# Patient Record
Sex: FEMALE | Race: WHITE | URBAN - METROPOLITAN AREA
[De-identification: names, ages, dates, MRNs, and addresses within clinical notes are randomized per-mention and may not be internally consistent; named-entity substitution may affect disease eponyms.]

---

## 2017-06-09 ENCOUNTER — HISTORICAL (OUTPATIENT)
Dept: LAB | Facility: HOSPITAL | Age: 82
End: 2017-06-09

## 2017-06-09 LAB
BUN SERPL-MCNC: 24 MG/DL (ref 7–18)
CALCIUM SERPL-MCNC: 9.2 MG/DL (ref 8.5–10.1)
CHLORIDE SERPL-SCNC: 104 MMOL/L (ref 98–107)
CO2 SERPL-SCNC: 39.4 MMOL/L (ref 21–32)
CREAT SERPL-MCNC: 0.92 MG/DL (ref 0.6–1.3)
GLUCOSE SERPL-MCNC: 122 MG/DL (ref 74–106)
POTASSIUM SERPL-SCNC: 5.1 MMOL/L (ref 3.5–5.1)
SODIUM SERPL-SCNC: 145 MMOL/L (ref 136–145)

## 2017-07-19 ENCOUNTER — HOSPITAL ENCOUNTER (OUTPATIENT)
Dept: MEDSURG UNIT | Facility: HOSPITAL | Age: 82
End: 2017-07-21
Attending: INTERNAL MEDICINE | Admitting: INTERNAL MEDICINE

## 2017-07-20 LAB
ABS NEUT (OLG): 2.3 X10(3)/MCL (ref 2.1–9.2)
ALBUMIN SERPL-MCNC: 3.2 GM/DL (ref 3.4–5)
ALBUMIN/GLOB SERPL: 1.2 RATIO (ref 1.1–2)
ALP SERPL-CCNC: 119 UNIT/L (ref 46–116)
ALT SERPL-CCNC: 16 UNIT/L (ref 12–78)
AST SERPL-CCNC: 14 UNIT/L (ref 15–37)
BASOPHILS # BLD AUTO: 0 X10(3)/MCL
BASOPHILS NFR BLD AUTO: 1 % (ref 0–2)
BILIRUB SERPL-MCNC: 1.1 MG/DL (ref 0.2–1)
BILIRUBIN DIRECT+TOT PNL SERPL-MCNC: 0.38 MG/DL (ref 0–0.2)
BILIRUBIN DIRECT+TOT PNL SERPL-MCNC: 0.72 MG/DL (ref 0–0.8)
BNP BLD-MCNC: 7448 PG/ML (ref 0–125)
BUN SERPL-MCNC: 27 MG/DL (ref 7–18)
CALCIUM SERPL-MCNC: 9 MG/DL (ref 8.5–10.1)
CHLORIDE SERPL-SCNC: 103 MMOL/L (ref 98–107)
CO2 SERPL-SCNC: 38.3 MMOL/L (ref 21–32)
CREAT SERPL-MCNC: 0.97 MG/DL (ref 0.6–1.3)
EOSINOPHIL # BLD AUTO: 0.1 X10(3)/MCL
EOSINOPHIL NFR BLD AUTO: 3 %
ERYTHROCYTE [DISTWIDTH] IN BLOOD BY AUTOMATED COUNT: 16.8 % (ref 11.5–17)
GLOBULIN SER-MCNC: 2.6 GM/DL (ref 2.4–3.5)
GLUCOSE SERPL-MCNC: 107 MG/DL (ref 74–106)
HCT VFR BLD AUTO: 34.4 % (ref 37–47)
HGB BLD-MCNC: 10.9 GM/DL (ref 12–16)
LYMPHOCYTES # BLD AUTO: 0.7 X10(3)/MCL
LYMPHOCYTES NFR BLD AUTO: 20 % (ref 13–40)
MCH RBC QN AUTO: 29.3 PG (ref 27–31)
MCHC RBC AUTO-ENTMCNC: 31.6 GM/DL (ref 33–36)
MCV RBC AUTO: 92.9 FL (ref 80–94)
MONOCYTES # BLD AUTO: 0.4 X10(3)/MCL
MONOCYTES NFR BLD AUTO: 10 % (ref 2–11)
NEUTROPHILS # BLD AUTO: 2.3 X10(3)/MCL (ref 2.1–9.2)
NEUTROPHILS NFR BLD AUTO: 66 % (ref 47–80)
PLATELET # BLD AUTO: 110 X10(3)/MCL (ref 130–400)
PMV BLD AUTO: 8.5 FL (ref 7.4–10.4)
POTASSIUM SERPL-SCNC: 3.9 MMOL/L (ref 3.5–5.1)
PROT SERPL-MCNC: 5.8 GM/DL (ref 6.4–8.2)
RBC # BLD AUTO: 3.71 X10(6)/MCL (ref 4.2–5.4)
SODIUM SERPL-SCNC: 144 MMOL/L (ref 136–145)
TROPONIN I SERPL-MCNC: 0.04 NG/ML (ref 0.02–0.06)
WBC # SPEC AUTO: 3.5 X10(3)/MCL (ref 4.5–11.5)

## 2017-08-17 ENCOUNTER — HISTORICAL (OUTPATIENT)
Dept: LAB | Facility: HOSPITAL | Age: 82
End: 2017-08-17

## 2017-08-17 LAB
BUN SERPL-MCNC: 35 MG/DL (ref 7–18)
CALCIUM SERPL-MCNC: 9.2 MG/DL (ref 8.5–10.1)
CHLORIDE SERPL-SCNC: 99 MMOL/L (ref 98–107)
CO2 SERPL-SCNC: 38 MMOL/L (ref 21–32)
CREAT SERPL-MCNC: 1.13 MG/DL (ref 0.6–1.3)
GLUCOSE SERPL-MCNC: 107 MG/DL (ref 74–106)
POTASSIUM SERPL-SCNC: 4.6 MMOL/L (ref 3.5–5.1)
SODIUM SERPL-SCNC: 139 MMOL/L (ref 136–145)

## 2022-04-30 NOTE — CONSULTS
Patient:   Aliyah De Leon            MRN: [id number]            FIN: [id number]               Age:   89 years     Sex:  Female     :  10/2/1917   Associated Diagnoses:   None   Author:   Kar Gibson DO      Basic Information   Source of history:  Family member, Medical record.       Chief Complaint   2017 13:59 CDT      Complains of shortness of breath onset after nap at 1230 today. History CHF      History of Present Illness   89 y/o WFwith known moderate MR, HFpEF, persistant afib, mild AI, pulmonary hypertension with estimated PASP 75mmHg presented to [facility] c/o dyspnea.        Review of Systems   Constitutional:  Fatigue.    Eye:  Negative.    Ear/Nose/Mouth/Throat:  Negative.    Respiratory:  Shortness of breath.    Cardiovascular:  Negative.    Gastrointestinal:  Negative.    Genitourinary:  Negative.    Hematology/Lymphatics:  Negative.    Endocrine:  Negative.    Immunologic:  Negative.    Musculoskeletal:  Negative.    Integumentary:  Negative.    Neurologic:  Negative.    Psychiatric:  Negative.       Health Status   Allergies:    Allergic Reactions (Selected)  No Known Allergies   Current medications:  (Selected)   Inpatient Medications  Ordered  Lasix: 40 mg, form: Injection, IV Push, BID, first dose 17 18:00:00 CDT, 24,037  Voltaren 1% topical gel: 0.02 EA, 2 gm =, form: Gel, TOP, QID, first dose 17 9:23:00 CDT  alPRAzolam 0.5 mg oral tablet: 0.25 mg, form: Tab, Oral, At Bedtime, first dose 17 21:00:00 CDT, 23188  aspirin 81 mg oral Delayed Release (EC) tablet: 81 mg, form: Tab-EC, Oral, Daily, first dose 17 9:00:00 CDT, 23  carvedilol 12.5 mg oral tablet: 12.5 mg, form: Tab, Oral, BID, first dose 17 21:00:00 CDT, 24,034  fentaNYL 25 mcg/hr transdermal film, extended release: 1 patch(es), 25 mcg =, form: Patch-72, TOP, q72hr, first dose 17 16:00:00 CDT  levothyroxine 0.075mg tab: 1 tab(s), form: Dose, Oral, Daily, first dose 17 6:00:00  CDT  nitroglycerin 2% transdermal ointment: 1 in, form: Ointment, TOP, [id number] (6-12-6-12), first dose 07/20/17 10:18:00 CDT  traMADol: 50 mg, form: Tab, Oral, q12hr PRN for pain, first dose 07/19/17 15:48:00 CDT, 30,024  Prescriptions  Prescribed  Proventil HFA 90 mcg/inh inhalation aerosol with adapter: 2 puff(s), INH, q4hr, PRN PRN shortness of breath or wheezing, # 6.7 gm, 0 Refill(s), [id number]  Documented Medications  Documented  Voltaren 1% topical gel: 1 jen, TOP, QID, # 100 gm, 0 Refill(s)  alPRAzolam 0.5 mg oral tablet: 0.25 mg = 0.5 tab(s), Oral, Daily, 0 Refill(s)  aspirin 81 mg oral tablet, CHEWABLE: 81 mg = 1 tab(s), Oral, Daily, 0 Refill(s)  carvedilol 12.5 mg oral tablet: 12.5 mg = 1 tab(s), Oral, BID, # 60 tab(s), 0 Refill(s)  fentaNYL 25 mcg/hr transdermal film, extended release: 1 patch(es), TOP, q72hr, 0 Refill(s)  furosemide 40 mg oral tablet: 40 mg = 1 tab(s), Oral, Daily, # 30 tab(s), 0 Refill(s)  levothyroxine 75 mcg (0.075 mg) oral tablet: = 1 tab(s), Oral, Daily, # 30 tab(s), 0 Refill(s)  traMADol 50 mg oral tablet: 50 mg = 1 tab(s), Oral, q12hr, PRN PRN for pain, # 30 tab(s), 0 Refill(s)   Problem list:    All Problems  Dysrhythmia / ICD-9-.9 / Confirmed  HTN / ICD-9-.9 / Confirmed,    Active Problems (2)  Dysrhythmia   HTN         Histories   Past Medical History:    Active  Dysrhythmia (427.9)  HTN (401.9)  Resolved  Pacemaker ([id number]):  Resolved on 12/8/2015 at 89 years.  Comments:  12/8/2015 CST 8:34 CST - SYSTEM  Problem automatically updated based on documentation on Capillary Refills, Brachial Pulses, Radial Pulses, Femoral Pulses, Dorsalis Pulses, Ulnar pulses, Popliteal Pulses, Postibial Pulses, Edemas, Affect/Behavior, Orientation Assessment, Arterial Line Site.  PNA (pneumonia) (486):  Resolved on 12/8/2015 at 89 years.  Comments:  12/8/2015 CST 8:34 CST - SYSTEM  Problem automatically updated based on  documentation on Capillary Refills, Brachial Pulses, Radial Pulses, Femoral Pulses, Dorsalis Pulses, Ulnar pulses, Popliteal Pulses, Postibial Pulses, Edemas, Affect/Behavior, Orientation Assessment, Arterial Line Site.  Home O2 ([id number]):  Resolved on 12/8/2015 at 89 years.  Comments:  12/8/2015 CST 8:34 CST - SYSTEM  Problem automatically updated based on documentation on Capillary Refills, Brachial Pulses, Radial Pulses, Femoral Pulses, Dorsalis Pulses, Ulnar pulses, Popliteal Pulses, Postibial Pulses, Edemas, Affect/Behavior, Orientation Assessment, Arterial Line Site.  Anxiety ([id number]):  Resolved on 12/8/2015 at 89 years.  Comments:  12/8/2015 CST 8:34 CST - SYSTEM  Problem automatically updated based on documentation on Capillary Refills, Brachial Pulses, Radial Pulses, Femoral Pulses, Dorsalis Pulses, Ulnar pulses, Popliteal Pulses, Postibial Pulses, Edemas, Affect/Behavior, Orientation Assessment, Arterial Line Site.  Hypothyroid ([id number]):  Resolved on 12/8/2015 at 89 years.  Comments:  12/8/2015 CST 8:34 CST - SYSTEM  Problem automatically updated based on documentation on Capillary Refills, Brachial Pulses, Radial Pulses, Femoral Pulses, Dorsalis Pulses, Ulnar pulses, Popliteal Pulses, Postibial Pulses, Edemas, Affect/Behavior, Orientation Assessment, Arterial Line Site.  Breast cancer ([id number]):  Resolved on 12/8/2015 at 89 years.  Comments:  12/8/2015 CST 8:34 CST - SYSTEM  Problem automatically updated based on documentation on Capillary Refills, Brachial Pulses, Radial Pulses, Femoral Pulses, Dorsalis Pulses, Ulnar pulses, Popliteal Pulses, Postibial Pulses, Edemas, Affect/Behavior, Orientation Assessment, Arterial Line Site.  Uterine cancer (182.0):  Resolved on 12/8/2015 at 89 years.  Comments:  12/8/2015 CST 8:34 CST - SYSTEM  Problem automatically updated based on documentation on Capillary Refills, Brachial Pulses, Radial Pulses, Femoral Pulses, Dorsalis Pulses, Ulnar pulses, Popliteal  Pulses, Postibial Pulses, Edemas, Affect/Behavior, Orientation Assessment, Arterial Line Site.  CHF (congestive heart failure) ([id number]):  Resolved on 12/8/2015 at 89 years.  Comments:  12/8/2015 CST 8:34 CST - SYSTEM  Problem automatically updated based on documentation on Capillary Refills, Brachial Pulses, Radial Pulses, Femoral Pulses, Dorsalis Pulses, Ulnar pulses, Popliteal Pulses, Postibial Pulses, Edemas, Affect/Behavior, Orientation Assessment, Arterial Line Site.   Family History:    Dementia  Sister  Acute myocardial infarction.  Father     Social History        Social & Psychosocial Habits    Alcohol  09/01/2013 Risk Assessment: Denies Alcohol Use    03/16/2016  Use: Never    Substance Abuse  04/19/2015 Risk Assessment: Denies Substance Abuse    03/16/2016  Use: Never    Tobacco  09/01/2013 Risk Assessment: Denies Tobacco Use    03/16/2016  Use: Never smoker  .        Physical Examination   General:  Alert and oriented, No acute distress.    HENT:  Normocephalic, [place].    Neck:  Supple.    Respiratory:  Lungs are clear to auscultation, Breath sounds are equal.    Cardiovascular:  Normal rate, irregular, 2-3/6 MARÍA.    Gastrointestinal:  Soft, Non-tender.    Vital Signs   7/20/2017 10:49 CDT      Systolic Blood Pressure   136 mmHg                             Diastolic Blood Pressure  80 mmHg    7/20/2017 7:00 CDT       Temperature Oral          36.3 DegC                             Peripheral Pulse Rate     108 bpm  HI                             SpO2                      98 %     Measurements from flowsheet : Measurements   7/20/2017 3:00 CDT       Weight Measured           62.8 kg                             Weighing Method           Standing    7/19/2017 16:28 CDT      Weight Dosing             62.8 kg                             Weight Measured and Calculated in Lbs     138.45 lb                             Height/Length Dosing      154 cm     Integumentary:  Warm, Dry.     Neurologic:  Alert, Oriented, No focal deficits.    Psychiatric:  Cooperative, Appropriate mood & affect, Normal judgment.       Impression and Plan   1. Dyspnea likley related to multiple factors  2. Persistant atrial fibrillation not on OAC due to bleeding risk  3. HFpEF (EF 50%)  4. Severe pulmonary hypertesion with estimated PASP by echo of 75mmHg  5. Moderate MR and Mild AI  6. Recent PNA     - Suggest continued rate control, do not feel rhythm control would be of benefit   - Gently diuresis   - No OAC due to bleeding risk   - Overall will need to discuss possible home Hospice given advanced age and now a few hospitalizaions; will discuss on next office visit   - Agree with DNR   - Please call with any additional questions or concerns.

## 2022-04-30 NOTE — DISCHARGE SUMMARY
Patient:   Aliyah De Leon            MRN: [id number]            FIN: [id number]               Age:   89 years     Sex:  Female     :  10/2/1917   Associated Diagnoses:   None   Author:   Talat Galarza MD      Chief Complaint      History of Present Illness   The patient presents with 3 day hx of worsening edema to lower extremities of gradual onset.  24 hours of increasing clear productive cough and orthopnea last 12-24 hour; no chest pain but has increasing dyspnea at rest and with exertion over last 24 hours; she notes increasing use of oxygen over last 48-72 hours;  she denies palpitatons, tachycardia. states gained 3# over last 4 days. no syncope; pt has had frequent falls over the last year; recent cervical fracture; pt is a DNR; wants to go home today if possible and states that her swelling, dyspnea and cough are improved     CC: SOB; much improved; ready to go.  Appreciate Cards input; Discussed with family      Health Status   Current medications:  (Selected)   Inpatient Medications  Ordered  Lasix: 40 mg, form: Injection, IV Push, BID, first dose 17 18:00:00 CDT, 24,037  Lopressor 50 mg oral tablet: 50 mg, form: Tab, Oral, BID, first dose 17 10:43:00 CDT, hold for HR <65 or sys <105, 24,034  Voltaren 1% topical gel: 2 gm, form: Gel, TOP, QID PRN for pain, first dose 17 12:52:00 CDT, 26,026  alPRAzolam 0.5 mg oral tablet: 0.25 mg, form: Tab, Oral, At Bedtime, first dose 17 21:00:00 CDT, 79333  aspirin 81 mg oral Delayed Release (EC) tablet: 81 mg, form: Tab-EC, Oral, Daily, first dose 17 9:00:00 CDT, 23  carvedilol 12.5 mg oral tablet: 12.5 mg, form: Tab, Oral, BID, first dose 17 21:00:00 CDT, 24,034  fentaNYL 25 mcg/hr transdermal film, extended release: 1 patch(es), 25 mcg =, form: Patch-72, TOP, q72hr, first dose 17 16:00:00 CDT  levothyroxine 0.075mg tab: 1 tab(s), form: Dose, Oral, Daily, first dose 17 6:00:00 CDT  traMADol: 50 mg, form:  Tab, Oral, q12hr PRN for pain, first dose 07/19/17 15:48:00 CDT, 30,024  Prescriptions  Prescribed  Lopressor 50 mg oral tablet: 50 mg = 1 tab(s), Oral, BID, # 60 tab(s), 0 Refill(s), [id number]  Proventil HFA 90 mcg/inh inhalation aerosol with adapter: 2 puff(s), INH, q4hr, PRN PRN shortness of breath or wheezing, # 6.7 gm, 0 Refill(s), [id number]  Documented Medications  Documented  Voltaren 1% topical gel: 1 jen, TOP, QID, # 100 gm, 0 Refill(s)  alPRAzolam 0.5 mg oral tablet: 0.25 mg = 0.5 tab(s), Oral, Daily, 0 Refill(s)  aspirin 81 mg oral tablet, CHEWABLE: 81 mg = 1 tab(s), Oral, Daily, 0 Refill(s)  carvedilol 12.5 mg oral tablet: 12.5 mg = 1 tab(s), Oral, BID, # 60 tab(s), 0 Refill(s)  fentaNYL 25 mcg/hr transdermal film, extended release: 1 patch(es), TOP, q72hr, 0 Refill(s)  furosemide 40 mg oral tablet: 40 mg = 1 tab(s), Oral, Daily, # 30 tab(s), 0 Refill(s)  levothyroxine 75 mcg (0.075 mg) oral tablet: = 1 tab(s), Oral, Daily, # 30 tab(s), 0 Refill(s)  traMADol 50 mg oral tablet: 50 mg = 1 tab(s), Oral, q12hr, PRN PRN for pain, # 30 tab(s), 0 Refill(s)      Physical Examination   General:  Alert and oriented, No acute distress.    Eye:  Pupils are equal, round and reactive to light, Extraocular movements are intact.    HENT:  Normocephalic, Normal hearing.    Neck:  Supple, Non-tender.    Respiratory:  Breath sounds are equal, rales R>L; no egophanty, no wheezing or ronchi.    Cardiovascular:  IRRR; JVD noted while pt sitting up at approx 70 degrees; .       Vital Signs (last 24 hrs)_____  Last Charted___________  Temp Oral     36.4 DegC  (JUL 21 08:59)  Heart Rate Peripheral   H 102bpm  (JUL 21 08:59)  Resp Rate         20 br/min  (JUL 21 08:00)  SBP      121 mmHg  (JUL 21 09:52)  DBP      73 mmHg  (JUL 21 09:52)  SpO2      98 %  (JUL 21 09:24)  Weight      59.5 kg  (JUL 21 05:00)  Height      154 cm  (JUL 20 13:01)  BMI      26.48  (JUL 20 13:01)      Genitourinary:  No costovertebral angle tenderness, No inguinal tenderness.    Lymphatics:  No lymphadenopathy neck, axilla, groin.    Musculoskeletal:  Normal range of motion, weak bilaterally.    Integumentary:  Warm, Dry.    Neurologic:  Alert, Oriented.    Cognition and Speech:  Oriented, Speech clear and coherent.    Psychiatric:  Cooperative, Appropriate mood & affect.       Review / Management   Results review:     Labs (Last four charted values)  Glucose              H 107 (JUL 20) .    Laboratory Results   * Final Report *    Reason For Exam  Cough    Radiology Report  Clinical History  Cough     Technique  Portable Single view AP radiograph of the chest.     Comparison  June 26, 2017     Findings  Right-sided cardiac device projects over the right hemithorax.  Prominent interstitial markings throughout. No focal opacification. No  acute osseous abnormality.        Impression  Findings of chronic lung disease with no acute cardiopulmonary  process. Further evaluation may be obtained with 2 views of the chest.       Signature Line  Electronically Signed By: Refugio Dill MD  Date/Time Signed: 07/19/2017 14:21      This document has an image    Result type: XR Chest 1 View  Result date: July 19, 2017 14:18 CDT  Result status: Auth (Verified)  Result title: XR Chest 1 View  Performed by: Refugio Dill MD on July 19, 2017 14:20 CDT  Verified by: Refugio Dill MD on July 19, 2017 14:21 CDT  Encounter info: [id number], [facility], Emergency, 7/19/2017 - 7/19/2017    Dr Arthur peraza  1. Dyspnea likley related to multiple factors  2. Persistant atrial fibrillation not on OAC due to bleeding risk  3. HFpEF (EF 50%)  4. Severe pulmonary hypertesion with estimated PASP by echo of 75mmHg  5. Moderate MR and Mild AI  6. Recent PNA     - Suggest continued rate control, do not feel rhythm control would be of benefit   - Gently diuresis   - No OAC due to bleeding risk   - Overall will need to  discuss possible home Hospice given advanced age and now a few hospitalizaions; will discuss on next office visit   - Agree with DNR   - Please call with any additional questions or concerns.                 Impression and Plan   CHF likely exacerbated by uncontrolled Afib episodes; add telemetry; pacer interrogation; consult [facility] for eval; continue diuresis, coreg; not on ACEI;better with diuretics;  possibly could add acei at low dose and/or increase coreg; certainly BP doesnt go along with severe loss in function; I favor rate control with increased blockade at this time unless echo felt needed by [facility];  last EF showed preserved numbers;  7/21: ok to dC; add lopressor for better rate control    Atrial Fib: pt not a candidate for anticoagulation; hx falls; rate control; on coreg; appears paroxysmal; may benefit from anti-arrhytmics but would need echo prior; defer to [facility]; 7/21: rate control measures; No anticoagulaton    Pt is DNR  Daughter is surrogate decision maker; papers are being filed for DNR status via POA  PCP Dr Ramsay  Cards: dr Gibson    will work on DC